# Patient Record
Sex: MALE | Race: WHITE
[De-identification: names, ages, dates, MRNs, and addresses within clinical notes are randomized per-mention and may not be internally consistent; named-entity substitution may affect disease eponyms.]

---

## 2017-10-03 ENCOUNTER — HOSPITAL ENCOUNTER (EMERGENCY)
Dept: HOSPITAL 10 - FTE | Age: 82
Discharge: HOME | End: 2017-10-03
Payer: MEDICARE

## 2017-10-03 VITALS
WEIGHT: 180.78 LBS | HEIGHT: 60 IN | BODY MASS INDEX: 35.49 KG/M2 | WEIGHT: 180.78 LBS | BODY MASS INDEX: 35.49 KG/M2 | HEIGHT: 60 IN

## 2017-10-03 VITALS
SYSTOLIC BLOOD PRESSURE: 154 MMHG | HEART RATE: 77 BPM | RESPIRATION RATE: 18 BRPM | TEMPERATURE: 99.8 F | DIASTOLIC BLOOD PRESSURE: 72 MMHG
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
## 2017-10-03 DIAGNOSIS — S82.55XA: Primary | ICD-10-CM

## 2017-10-03 DIAGNOSIS — I10: ICD-10-CM

## 2017-10-03 PROCEDURE — 73610 X-RAY EXAM OF ANKLE: CPT

## 2017-10-03 NOTE — RADRPT
PROCEDURE: XR Left Ankle

 

CLINICAL INDICATION:  Trauma, pain

 

TECHNIQUE:   Standard 3 view radiographs were submitted.

 

COMPARISON:   None

 

FINDINGS:

 

Osseous structures: There is a subtle nondisplaced fracture at the site of the fused epiphyseal plat
e with a slight cortical step-off but without significant displacement. On the AP view there appears
 to be slight cortical buckling at the lateral base of the medial malleolus. There is a nondisplaced
 posterior malleolar fracture is seen on the lateral projection. There is minimal calcaneal spurring
 at the insertion of the plantar aponeurosis.

 

Joint spaces:  Well maintained with no significant erosions or spurring evident.

 

Soft tissues: There is vascular calcification. There is lateral soft tissue swelling along with a 1.
6 cm in diameter old void soft tissue nodule projecting lateral and ventral to the distal fibular di
aphysis.

 

IMPRESSION: 

1.  Nondisplaced posterior malleolar fracture.

2.  Small cortical step off at the lateral fused epiphyses old white at the distal fibula suspicious
 for a nondisplaced fracture.

3.  Slight cortical buckling seen medial to the base of the medial malleolus.

4.  Lateral soft tissue swelling with a 1.6 cm soft tissue nodule located ventral and lateral to the
 distal fibular diaphysis.

5.  Vascular calcification.

_____________________________________________ 

Physician Krystyna           Date    Time 

Electronically viewed and signed by Physician Krystyna on 10/03/2017 12:42 

 

D:  10/03/2017 12:42  T:  10/03/2017 12:42

/

## 2017-10-03 NOTE — RADRPT
PROCEDURE: XR Left Foot

 

CLINICAL INDICATION:  Pain

 

TECHNIQUE: AP, oblique, and lateral radiographs were submitted.

 

COMPARISON:  None

 

FINDINGS:

 

Osseous structures: A nondisplaced posterior malleolar fracture is evident. The remaining visualized
 osseous elements appear intact. There is minimal calcaneal spurring at the insertion of the plantar
 aponeurosis.

 

Joint spaces:  are well maintained, with no significant spurring, erosion or joint effusion evident.

 

Soft tissues: Vascular calcification is evident and there is a 1.6 cm soft tissue nodule projecting 
ventral to the distal fibula and tibia.

 

IMPRESSION: 

1.  Nondisplaced posterior malleolar fracture.

2.  Mild calcaneal spurring

3.  Vascular calcification

4.  1.6 cm soft tissue nodule projects ventral to the distal tibia and fibula.

_____________________________________________ 

Physician Krystyna           Date    Time 

Electronically viewed and signed by Physician Krystyna on 10/03/2017 12:44 

 

D:  10/03/2017 12:44  T:  10/03/2017 12:44

/

## 2017-10-03 NOTE — ERD
ER Documentation


Chief Complaint


Date/Time


DATE: 10/3/17 


TIME: 12:54


Chief Complaint


left ankle/foot pain after a mech fall yesterday





HPI


This 82-year-old male complains of left ankle pain after slipping backwards 

yesterday while walking.  He has bruising and swelling on the left ankle with 

some blisters.  Denies restricted range of motion weakness or head injury or 

neck pain or additional symptoms.





ROS


All systems reviewed and are negative except as per history of present illness.





Medications


Home Meds


Active Scripts


Acetaminophen* (Tylophen*) 500 Mg Capsule, 1 CAP PO Q6H Y for PAIN AND OR 

ELEVATED TEMP, #20 CAP


   Prov:CATHRYN SINGH MD         10/3/17


Cephalexin* (Keflex*) 500 Mg Capsule, 500 MG PO QID for 7 Days, CAP


   Prov:CATHRYN SINGH MD         10/3/17





Allergies


Allergies:  


Coded Allergies:  


     No Known Allergy (Unverified , 10/3/17)





PMhx/Soc


Medical and Surgical Hx:  pt denies Surgical Hx


History of Surgery:  No


Anesthesia Reaction:  No


Hx Neurological Disorder:  No


Hx Respiratory Disorders:  No


Hx Cardiac Disorders:  Yes (hypertension)


Hx Psychiatric Problems:  No


Hx Miscellaneous Medical Probl:  No


Hx Alcohol Use:  No


Hx Substance Use:  No


Hx Tobacco Use:  No


Smoking Status:  Never smoker





Physical Exam


Vitals





Vital Signs








  Date Time  Temp Pulse Resp B/P Pulse Ox O2 Delivery O2 Flow Rate FiO2


 


10/3/17 11:34 100.0 89 18 164/74 99   








Physical Exam


Const:  [] Alert, non-ill-appearing.


Head:   Atraumatic 


Eyes:    Normal Conjunctiva


ENT:    Normal External Ears, Nose and Mouth.


Neck:               Full range of motion..~ No meningismus.


Resp:    Clear to auscultation bilaterally


Cardio:    Regular rate and rhythm, no murmurs


Abd:    Soft, non tender, non distended. Normal bowel sounds


Skin:    No petechiae or rashes


Back:    No midline or flank tenderness


Ext:    No cyanosis, or edema there is some bruising and swelling and 

ecchymosis around the left ankle joint.  Patient has full range of motion to 

plantarflexion and dorsiflexion.  There is some scattered fracture blisters 

without erythema or warmth.


Neur:    Awake and alert


Psych:    Normal Mood and Affect


Results 24 hrs





 Current Medications








 Medications


  (Trade)  Dose


 Ordered  Sig/Portia


 Route


 PRN Reason  Start Time


 Stop Time Status Last Admin


Dose Admin


 


 Acetaminophen


  (Tylenol Tab)  500 mg  ONCE  STAT


 PO


   10/3/17 12:06


 10/3/17 12:07 DC 10/3/17 12:12


 











Procedures/MDM


X-ray left ankle 3V Interpreted by me: 


Bones:    Posterior tibial fracture at the joint line.  There is possibly some 

cortical abnormalities of the distal fibula medial tibia as well.


Joints:       No dislocation impression-posterior malleolar fracture possible 

cortical abnormalities of the distal fibula.





Present with left ankle fracture without current evidence of infection, no 

evidence of deficits or ischemia.  Patient is placed in a left lower extremity 

walker boot administered crutches with crutch training.  Patient is 

neurovascular intact after the boot.  Fracture blisters were dressed with 

Neosporin Xeroform.  Patient will be treated with Keflex given the fracture 

blisters to prevent an infection, instructions for wound care, Tylenol and 

orthopedic follow-up this week.  Return sooner for fevers, redness, new 

worsening symptoms.





Departure


Diagnosis:  


 Primary Impression:  


 Ankle fracture, left


 Encounter type:  initial encounter  Fracture type:  closed  Qualified Code:  

S82.892A - Closed fracture of left ankle, initial encounter


Condition:  Stable


Patient Instructions:  Treating Ankle Fractures, Fracture, Ankle (General)


Referrals:  


MARTIN COOLEY MD,IN [facility]





Additional Instructions:  


Pedis within the next week for further evaluation and treatment.  Recheck 

sooner for redness, fevers, new symptoms.











CATHRYN SINGH MD Oct 3, 2017 12:56

## 2021-04-09 ENCOUNTER — HOSPITAL ENCOUNTER (EMERGENCY)
Dept: HOSPITAL 54 - ER | Age: 86
LOS: 1 days | Discharge: HOME | End: 2021-04-10
Payer: MEDICARE

## 2021-04-09 VITALS — BODY MASS INDEX: 26.46 KG/M2 | WEIGHT: 155 LBS | HEIGHT: 64 IN

## 2021-04-09 DIAGNOSIS — S60.221A: ICD-10-CM
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
## 2021-04-09 DIAGNOSIS — Z98.890: ICD-10-CM

## 2021-04-09 DIAGNOSIS — R51.9: ICD-10-CM
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
## 2021-04-09 DIAGNOSIS — I10: ICD-10-CM
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
## 2021-04-09 DIAGNOSIS — S00.31XA: ICD-10-CM

## 2021-04-09 DIAGNOSIS — S00.83XA: Primary | ICD-10-CM

## 2021-04-09 DIAGNOSIS — Z79.899: ICD-10-CM

## 2021-04-09 PROCEDURE — 72125 CT NECK SPINE W/O DYE: CPT

## 2021-04-09 PROCEDURE — 70486 CT MAXILLOFACIAL W/O DYE: CPT

## 2021-04-09 PROCEDURE — 99285 EMERGENCY DEPT VISIT HI MDM: CPT

## 2021-04-09 PROCEDURE — 73130 X-RAY EXAM OF HAND: CPT

## 2021-04-09 PROCEDURE — 90471 IMMUNIZATION ADMIN: CPT

## 2021-04-09 PROCEDURE — 70450 CT HEAD/BRAIN W/O DYE: CPT

## 2021-04-09 PROCEDURE — A6403 STERILE GAUZE>16 <= 48 SQ IN: HCPCS

## 2021-04-09 PROCEDURE — 90715 TDAP VACCINE 7 YRS/> IM: CPT

## 2021-04-09 NOTE — NUR
PT AAOX4. [place] SPEAKING BIB FAMILY C/O ABRASION TO FOREHEAD, LACERATION TO 
NOSE S/P GLF. NO KO. PT PLACED ON MONITOR AND PULSE OX. VSS.

## 2021-04-10 VITALS — DIASTOLIC BLOOD PRESSURE: 78 MMHG | SYSTOLIC BLOOD PRESSURE: 131 MMHG

## 2021-04-10 NOTE — NUR
Patient discharged to home in stable condition. Written and verbal after care 
instructions given. Patient verbalizes understanding of instruction and RX in 
[place].